# Patient Record
Sex: FEMALE | Race: BLACK OR AFRICAN AMERICAN
[De-identification: names, ages, dates, MRNs, and addresses within clinical notes are randomized per-mention and may not be internally consistent; named-entity substitution may affect disease eponyms.]

---

## 2018-05-04 ENCOUNTER — HOSPITAL ENCOUNTER (EMERGENCY)
Dept: HOSPITAL 62 - ER | Age: 48
Discharge: HOME | End: 2018-05-04
Payer: COMMERCIAL

## 2018-05-04 VITALS — SYSTOLIC BLOOD PRESSURE: 137 MMHG | DIASTOLIC BLOOD PRESSURE: 71 MMHG

## 2018-05-04 DIAGNOSIS — D50.9: ICD-10-CM

## 2018-05-04 DIAGNOSIS — R51: Primary | ICD-10-CM

## 2018-05-04 DIAGNOSIS — R03.0: ICD-10-CM

## 2018-05-04 LAB
%HYPO/RBC NFR BLD AUTO: (no result) %
ABSOLUTE LYMPHOCYTES# (MANUAL): 1 10^3/UL (ref 0.5–4.7)
ABSOLUTE MONOCYTES # (MANUAL): 0.3 10^3/UL (ref 0.1–1.4)
ABSOLUTE NEUTROPHILS# (MANUAL): 1.9 10^3/UL (ref 1.7–8.2)
ABSOLUTE RETICS #: 0.04 10^6/UL (ref 0.03–0.12)
ADD MANUAL DIFF: YES
ANISOCYTOSIS BLD QL SMEAR: (no result)
BASOPHILS NFR BLD MANUAL: 1 % (ref 0–2)
EOSINOPHIL NFR BLD MANUAL: 2 % (ref 0–6)
ERYTHROCYTE [DISTWIDTH] IN BLOOD BY AUTOMATED COUNT: 19.5 % (ref 11.5–14)
FERRITIN SERPL-MCNC: 3.63 NG/ML (ref 6.2–137)
FOLATE SERPL-MCNC: 10.9 NG/ML (ref 2.76–?)
HCT VFR BLD CALC: 25 % (ref 36–47)
HGB BLD-MCNC: 7.3 G/DL (ref 12–15.5)
IRON(TIBC): 18.2 UG/DL (ref 37–170)
MCH RBC QN AUTO: 16.8 PG (ref 27–33.4)
MCHC RBC AUTO-ENTMCNC: 29.3 G/DL (ref 32–36)
MCV RBC AUTO: 58 FL (ref 80–97)
MONOCYTES % (MANUAL): 9 % (ref 3–13)
OVALOCYTES BLD QL SMEAR: (no result)
PLATELET # BLD: 460 10^3/UL (ref 150–450)
PLATELET COMMENT: ADEQUATE
POIKILOCYTOSIS BLD QL SMEAR: (no result)
RBC # BLD AUTO: 4.35 10^6/UL (ref 3.72–5.28)
RETICULOCYTE COUNT (AUTO): 1.06 % (ref 0.66–2.85)
SEGMENTED NEUTROPHILS % (MAN): 58 % (ref 42–78)
TOTAL CELLS COUNTED BLD: 100
VARIANT LYMPHS NFR BLD MANUAL: 29 % (ref 13–45)
VIT B12 SERPL-MCNC: 246 PG/ML (ref 239–931)
WBC # BLD AUTO: 3.2 10^3/UL (ref 4–10.5)

## 2018-05-04 PROCEDURE — 36415 COLL VENOUS BLD VENIPUNCTURE: CPT

## 2018-05-04 PROCEDURE — 83550 IRON BINDING TEST: CPT

## 2018-05-04 PROCEDURE — 82607 VITAMIN B-12: CPT

## 2018-05-04 PROCEDURE — 84466 ASSAY OF TRANSFERRIN: CPT

## 2018-05-04 PROCEDURE — 99284 EMERGENCY DEPT VISIT MOD MDM: CPT

## 2018-05-04 PROCEDURE — 83540 ASSAY OF IRON: CPT

## 2018-05-04 PROCEDURE — 70450 CT HEAD/BRAIN W/O DYE: CPT

## 2018-05-04 PROCEDURE — 82728 ASSAY OF FERRITIN: CPT

## 2018-05-04 PROCEDURE — 85045 AUTOMATED RETICULOCYTE COUNT: CPT

## 2018-05-04 PROCEDURE — 82746 ASSAY OF FOLIC ACID SERUM: CPT

## 2018-05-04 PROCEDURE — 85025 COMPLETE CBC W/AUTO DIFF WBC: CPT

## 2018-05-04 NOTE — RADIOLOGY REPORT (SQ)
EXAM DESCRIPTION: CT HEAD WITHOUT



CLINICAL HISTORY: facial pain, sinus pressure



COMPARISON: None available



TECHNIQUE: Axial CT of the head obtained from the skull apex to

the skull base without contrast.



FINDINGS: 

No acute intracranial hemorrhage identified. No mass, mass

effect, shift of the midline, abnormal extra-axial fluid

collection or CT evidence of acute ischemic change identified.

The ventricular system is unremarkable.  No acute abnormalities

of the supratentorial white matter, basal ganglia, cerebellum, or

brainstem.



The visualized paranasal sinuses and the mastoids are clear.

  No skull fracture identified.  Visualized orbits and globes are

unremarkable.



DLP:1017.17 mGy-cm



IMPRESSION:

1.  No acute intracranial abnormality identified. Visualized

paranasal sinuses are well aerated.



This exam was performed according to our departmental

dose-optimization program, which includes automated exposure

control, adjustment of the mA and/or kV according to patient size

and/or use of iterative reconstruction technique.

## 2018-05-04 NOTE — ER DOCUMENT REPORT
ED Headache





- General


Chief Complaint: Headache


Stated Complaint: HEADACHE


Time Seen by Provider: 18 06:35


Notes: 





This is a 47-year-old female patient to the emergency department with chief 

complaint of headache for the last 4 days.  Not the worst headache of her life.

  Never has really had headaches though.  Did not come on suddenly.  Does not 

know when it really started.  Not associated with any particular event.  Denies 

any vision changes.  Denies any fever, chills, sweats.  Denies any neck pain.  

Denies any weakness of the upper or lower extremities.  No numbness to the 

face.  No confusion.  No other people in the home with headaches.  No exposure 

to carbon monoxide or carbon monoxide risks.  No significant family history of 

headaches.  States that the only thing that seems to run in the family is high 

blood pressure.  States that her father  of a brain tumor of some sort but 

thinks it was more likely a pituitary tumor.  It was a slow-growing tumor and 

he had surgery for but never did really completely recover and subsequently 

 of complications.  Obviously based on his history patient has a little 

concerned of having something like her father had.


TRAVEL OUTSIDE OF THE [place] IN LAST 30 DAYS: No





- HPI


Patient complains to provider of: Headache


Onset was: Cannot pinpoint, Gradual.  denies: Abrupt, Thunderclap, While 

turning head, Other


Timing: Still present


Quality of pain: Throbbing


Pain Level: 4


Associated symptoms: None





- Related Data


Allergies/Adverse Reactions: 


 





No Known Allergies Allergy (Verified 18 07:12)


 











Past Medical History





- General


Information source: Patient





- Social History


Smoking Status: Never Smoker


Frequency of alcohol use: None


Drug Abuse: None


Lives with: Spouse/Significant other


Family History: Reviewed & Not Pertinent





- Medical History


Medical History: Negative


Surgical Hx: Other - 





Review of Systems





- Review of Systems


Constitutional: denies: Chills, Diaphoresis, Fever, Malaise


EENT: denies: Eye discharge, Blurred vision, Double vision, Ear pain, Nose pain

, Sinus pressure, Sinus discharge, Throat pain, Difficulty swallowing, Mouth 

pain, Mouth swelling


Cardiovascular: denies: Chest pain, Palpitations, Heart racing, Orthopnea, 

Dyspnea, Syncope, Dizziness, Lightheaded


Respiratory: denies: Cough, Hurts to breathe, Hemoptysis, Short of breath, 

Wheezing


Gastrointestinal: denies: Abdominal pain, Diarrhea, Nausea, Vomiting


Genitourinary: denies: Burning, Dysuria, Discharge


Musculoskeletal: denies: Back pain, Gout, Joint pain, Muscle pain, Muscle 

stiffness, Neck pain


Skin: denies: Change in color, Dryness, Lesions, Rash


Hematologic/Lymphatic: denies: Anemia, Blood clots, Easy bleeding


Neurological/Psychological: Headaches.  denies: Confusion, Sensory change, 

Weakness, Gait changes, Paralysis, Seizure, Numbness





Physical Exam





- Vital signs


Vitals: 


 











Temp Pulse Resp BP Pulse Ox


 


 98 F   74   18   150/92 H  100 


 


 18 06:11  18 06:11  18 06:11  18 06:11  18 06:11











Interpretation: Normal





- General


General appearance: Appears well, Alert





- HEENT


Head: Normocephalic, Atraumatic


Eyes: Normal


Pupils: PERRL


Anterior chamber: Normal


Fundascopic: Normal


Nerve palsy: No


Sinus: Normal


Nasal: Normal


Mouth/Lips: Normal


Mucous membranes: Normal


Pharynx: Normal


Neck: Normal.  No: Brudzinski, Meningismus, Neck mass, Supple





- Respiratory


Respiratory status: No respiratory distress


Chest status: Nontender


Breath sounds: Normal


Chest palpation: Normal





- Cardiovascular


Rhythm: Regular


Heart sounds: Normal auscultation


Murmur: No





- Abdominal


Inspection: Normal


Distension: No distension


Bowel sounds: Normal


Tenderness: Nontender


Organomegaly: No organomegaly





- Back


Back: Normal, Nontender





- Extremities


General upper extremity: Normal inspection, Nontender, Normal color, Normal ROM

, Normal temperature


General lower extremity: Normal inspection, Nontender, Normal color, Normal ROM

, Normal temperature, Normal weight bearing.  No: Michael's sign





- Neurological


Neuro grossly intact: Yes


Cognition: Normal


Orientation: AAOx4


[place] Coma Scale Eye Opening: Spontaneous


[place] Coma Scale Verbal: Oriented


[place] Coma Scale Motor: Obeys Commands


[place] Coma Scale Total: 15


Speech: Normal


Motor strength normal: LUE, RUE, LLE, RLE


Sensory: Normal





- Psychological


Associated symptoms: Normal affect, Normal mood





- Skin


Skin Temperature: Warm


Skin Moisture: Dry


Skin Color: Normal





Course





- Re-evaluation


Re-evalutation: 





18 07:27


This is a well-appearing female in no acute distress with complaint of 

headache.  Will get a head CT.  Ibuprofen and reassess


18 07:27





 





Head CT  18 06:55


IMPRESSION:


1.  No acute intracranial abnormality identified. Visualized


paranasal sinuses are well aerated.


 


This exam was performed according to our departmental


dose-optimization program, which includes automated exposure


control, adjustment of the mA and/or kV according to patient size


and/or use of iterative reconstruction technique.


 








CT head unremarkable.  Ibuprofen given as patient did not want an IV.  At this 

time has low risk for headaches.  Did not feel this is subarachnoid hemorrhage.

  CT head was unremarkable for any significant masses or lesions.  Sinuses were 

patent.  Will recommend at this time anti-inflammatory therapy as first-line 

treatment.  Patient incidentally does have some high blood pressure.  This 

could be contributing to her headache as well.  Because this is the only time 

that we have ever seen her and this is a single elevated blood pressure reading 

do not feel compelled to start on treatment but will give her information with 

regards to high blood pressure and patient will need to follow-up with her 

regular doctor to consider treatment for that.





- Vital Signs


Vital signs: 


 











Temp Pulse Resp BP Pulse Ox


 


 98 F   74   18   150/92 H  100 


 


 18 06:11  18 06:11  18 06:11  18 06:11  18 06:11














Discharge





- Discharge


Clinical Impression: 


Headache


Qualifiers:


 Headache type: unspecified Headache chronicity pattern: acute headache 

Intractability: not intractable Qualified Code(s): R51 - Headache





Condition: Good


Disposition: HOME, SELF-CARE


Instructions:  Headache ([facility])


Additional Instructions: 


High Blood Pressure





   When your blood pressure was taken today it was elevated.  Today's reading 

was___150/90___________.  





   Pre-hypertension/Hypertension: The patient has been informed that they may 

have pre-hypertension or Hypertension based on a blood pressure reading in the 

emergency department. I recommend that the patient call the primary care 

provider listed on their discharge instructions or a physician of their choice 

this wee to arrange follow up for further evaluation of possible pre-

hypertension or Hypertension.





   Sometimes, stress or illness causes a temporary elevation of your blood 

pressure.  We suggest that you get your blood pressure measured three more 

times during the next few days to see if this is more than a temporary 

abnormality.  If your blood pressure is greater than 150/90 on each occasion, 

you must have treatment.





   Some simple things you can do to help are: If you have blood pressure 

medicine but aren't using it regularly, start taking it again. Get some aerobic 

exercise for at least 20 minutes on a daily basis. (See your doctor before 

beginning a new exercise program.) Eat a low-fat diet. Lose excess weight.  

Avoid salty foods and avoid adding salt to any of the foods you eat.  Avoid 

diet pills, decongestants, "energizing" herbs, and other medicines that elevate 

blood pressure.


   


   If left untreated, hypertension greatly enhances your risk for developing 

heart disease and strokes.  Please don't ignore this problem.


     


Prescriptions: 


Ibuprofen [Motrin 800 mg Tablet] 800 mg PO Q8H PRN 5 Days #15 tab


 PRN Reason: 


Referrals: 


YSABEL FARRELL MD [Primary Care Provider] - Follow up as needed

## 2018-05-07 LAB — PATH REV BLD -IMP: (no result)
